# Patient Record
Sex: FEMALE | Race: BLACK OR AFRICAN AMERICAN | NOT HISPANIC OR LATINO | URBAN - METROPOLITAN AREA
[De-identification: names, ages, dates, MRNs, and addresses within clinical notes are randomized per-mention and may not be internally consistent; named-entity substitution may affect disease eponyms.]

---

## 2020-02-20 ENCOUNTER — EMERGENCY (EMERGENCY)
Facility: HOSPITAL | Age: 20
LOS: 1 days | Discharge: HOME | End: 2020-02-20
Attending: EMERGENCY MEDICINE | Admitting: EMERGENCY MEDICINE
Payer: MEDICAID

## 2020-02-20 VITALS
RESPIRATION RATE: 18 BRPM | OXYGEN SATURATION: 97 % | HEIGHT: 68 IN | DIASTOLIC BLOOD PRESSURE: 68 MMHG | WEIGHT: 250 LBS | TEMPERATURE: 98 F | SYSTOLIC BLOOD PRESSURE: 107 MMHG | HEART RATE: 83 BPM

## 2020-02-20 PROCEDURE — 99283 EMERGENCY DEPT VISIT LOW MDM: CPT

## 2020-02-20 RX ORDER — CARBAMAZEPINE 200 MG
400 TABLET ORAL ONCE
Refills: 0 | Status: COMPLETED | OUTPATIENT
Start: 2020-02-20 | End: 2020-02-20

## 2020-02-20 RX ORDER — CARBAMAZEPINE 200 MG
3 TABLET ORAL
Qty: 90 | Refills: 0
Start: 2020-02-20 | End: 2020-03-20

## 2020-02-20 RX ADMIN — Medication 400 MILLIGRAM(S): at 20:29

## 2020-02-20 NOTE — ED ADULT NURSE NOTE - OBJECTIVE STATEMENT
21 y/o F asking for Tegretol medication refill. Pt states she ran out of meds and is nervous [place] if she [place]'t get dose. Pt denies any s/s at this time. Pt denies recent illness, fever, CP, SOB, N/V/D. Pt denies additional [facility]. 21 y/o F asking for Tegretol medication refill. Pt states she ran out of meds and is nervous she will [place] if she doesn't get dose. Pt denies any s/s at this time. Pt denies recent illness, fever, CP, SOB, N/V/D. Pt denies additional PMH.

## 2020-02-20 NOTE — ED PROVIDER NOTE - CLINICAL SUMMARY MEDICAL DECISION MAKING FREE TEXT BOX
plan to give dose in ed, and called into pharm. patient well appearing and neuro intact. requesting dc to obtain her shelter bed.

## 2020-02-20 NOTE — ED ADULT TRIAGE NOTE - CHIEF COMPLAINT QUOTE
Patient to ED stating that she missed her epilepsy medications today - patient afraid that [place] will [place] a [place].  No distress

## 2020-02-20 NOTE — ED PROVIDER NOTE - OBJECTIVE STATEMENT
21 yo F, hx of seizure disorder, presents with request for her evening dose of her carbamaezpime. denies seizure, denies aura or symptoms of seizures.

## 2020-02-20 NOTE — ED PROVIDER NOTE - PATIENT PORTAL LINK FT
You can access the FollowMyHealth Patient Portal offered by [facility] by registering at the following website: [email or web address]. By joining [employer]’s FollowMyHealth portal, you will also be able to view your health information using other applications (apps) compatible with our system.

## 2020-02-26 DIAGNOSIS — R56.9 UNSPECIFIED CONVULSIONS: ICD-10-CM

## 2020-02-26 DIAGNOSIS — Z76.0 ENCOUNTER FOR ISSUE OF REPEAT PRESCRIPTION: ICD-10-CM

## 2021-09-10 NOTE — ED ADULT NURSE NOTE - NS ED NURSE RECORD ANOTHER HT AND WT
Received change of shift report from day-shift RN and assumed care of patient at 1900. Assessment performed. Patient is alert and oriented to self and place. Patient denies any pain at this time. Patient call light within reach, personal possessions nearby, bed in low position and locked, hourly rounding in practice, and non-skid socks in place.   Yes

## 2022-11-18 NOTE — ED ADULT NURSE NOTE - NSFALLRSKPASTHIST_ED_ALL_ED
Humalog      Last Written Prescription Date:  10.19.22  Last Fill Quantity: #15mL,   # refills: 0  Last Office Visit: 4.13.22 with Andreea Schuster  Future Office visit:    Next 5 appointments (look out 90 days)    Dec 15, 2022 11:30 AM  (Arrive by 11:15 AM)  Return Visit with Marian Geller DPM  [facility] [address] [place] [phone] [phone]           Routing refill request to provider for review/approval because:      
no